# Patient Record
Sex: FEMALE | Race: WHITE | HISPANIC OR LATINO
[De-identification: names, ages, dates, MRNs, and addresses within clinical notes are randomized per-mention and may not be internally consistent; named-entity substitution may affect disease eponyms.]

---

## 2017-01-22 ENCOUNTER — RESULT REVIEW (OUTPATIENT)
Age: 47
End: 2017-01-22

## 2017-01-29 ENCOUNTER — RESULT REVIEW (OUTPATIENT)
Age: 47
End: 2017-01-29

## 2017-03-12 ENCOUNTER — EMERGENCY (EMERGENCY)
Facility: HOSPITAL | Age: 47
LOS: 1 days | Discharge: HOME | End: 2017-03-12
Attending: EMERGENCY MEDICINE | Admitting: EMERGENCY MEDICINE
Payer: COMMERCIAL

## 2017-03-12 VITALS
DIASTOLIC BLOOD PRESSURE: 76 MMHG | RESPIRATION RATE: 16 BRPM | SYSTOLIC BLOOD PRESSURE: 128 MMHG | HEART RATE: 81 BPM | OXYGEN SATURATION: 100 %

## 2017-03-12 VITALS
SYSTOLIC BLOOD PRESSURE: 141 MMHG | OXYGEN SATURATION: 98 % | RESPIRATION RATE: 18 BRPM | TEMPERATURE: 98 F | HEART RATE: 84 BPM | DIASTOLIC BLOOD PRESSURE: 74 MMHG

## 2017-03-12 LAB
ALBUMIN SERPL ELPH-MCNC: 3.9 G/DL — SIGNIFICANT CHANGE UP (ref 3.3–5)
ALP SERPL-CCNC: 84 U/L — SIGNIFICANT CHANGE UP (ref 40–120)
ALT FLD-CCNC: 20 U/L — SIGNIFICANT CHANGE UP (ref 4–33)
APPEARANCE UR: CLEAR — SIGNIFICANT CHANGE UP
AST SERPL-CCNC: 16 U/L — SIGNIFICANT CHANGE UP (ref 4–32)
BASOPHILS # BLD AUTO: 0.01 K/UL — SIGNIFICANT CHANGE UP (ref 0–0.2)
BASOPHILS NFR BLD AUTO: 0.1 % — SIGNIFICANT CHANGE UP (ref 0–2)
BILIRUB SERPL-MCNC: 0.3 MG/DL — SIGNIFICANT CHANGE UP (ref 0.2–1.2)
BILIRUB UR-MCNC: NEGATIVE — SIGNIFICANT CHANGE UP
BLOOD UR QL VISUAL: NEGATIVE — SIGNIFICANT CHANGE UP
BUN SERPL-MCNC: 14 MG/DL — SIGNIFICANT CHANGE UP (ref 7–23)
CALCIUM SERPL-MCNC: 8.9 MG/DL — SIGNIFICANT CHANGE UP (ref 8.4–10.5)
CHLORIDE SERPL-SCNC: 103 MMOL/L — SIGNIFICANT CHANGE UP (ref 98–107)
CK MB BLD-MCNC: 1.24 NG/ML — SIGNIFICANT CHANGE UP (ref 1–4.7)
CK MB BLD-MCNC: SIGNIFICANT CHANGE UP (ref 0–2.5)
CK SERPL-CCNC: 65 U/L — SIGNIFICANT CHANGE UP (ref 25–170)
CO2 SERPL-SCNC: 22 MMOL/L — SIGNIFICANT CHANGE UP (ref 22–31)
COLOR SPEC: SIGNIFICANT CHANGE UP
CREAT SERPL-MCNC: 0.85 MG/DL — SIGNIFICANT CHANGE UP (ref 0.5–1.3)
EOSINOPHIL # BLD AUTO: 0.15 K/UL — SIGNIFICANT CHANGE UP (ref 0–0.5)
EOSINOPHIL NFR BLD AUTO: 1.9 % — SIGNIFICANT CHANGE UP (ref 0–6)
GLUCOSE SERPL-MCNC: 97 MG/DL — SIGNIFICANT CHANGE UP (ref 70–99)
GLUCOSE UR-MCNC: NEGATIVE — SIGNIFICANT CHANGE UP
HCT VFR BLD CALC: 36.2 % — SIGNIFICANT CHANGE UP (ref 34.5–45)
HGB BLD-MCNC: 12.2 G/DL — SIGNIFICANT CHANGE UP (ref 11.5–15.5)
IMM GRANULOCYTES NFR BLD AUTO: 0.1 % — SIGNIFICANT CHANGE UP (ref 0–1.5)
KETONES UR-MCNC: NEGATIVE — SIGNIFICANT CHANGE UP
LEUKOCYTE ESTERASE UR-ACNC: NEGATIVE — SIGNIFICANT CHANGE UP
LIDOCAIN IGE QN: 29.3 U/L — SIGNIFICANT CHANGE UP (ref 7–60)
LYMPHOCYTES # BLD AUTO: 2.17 K/UL — SIGNIFICANT CHANGE UP (ref 1–3.3)
LYMPHOCYTES # BLD AUTO: 28.1 % — SIGNIFICANT CHANGE UP (ref 13–44)
MCHC RBC-ENTMCNC: 31.7 PG — SIGNIFICANT CHANGE UP (ref 27–34)
MCHC RBC-ENTMCNC: 33.7 % — SIGNIFICANT CHANGE UP (ref 32–36)
MCV RBC AUTO: 94 FL — SIGNIFICANT CHANGE UP (ref 80–100)
MONOCYTES # BLD AUTO: 0.41 K/UL — SIGNIFICANT CHANGE UP (ref 0–0.9)
MONOCYTES NFR BLD AUTO: 5.3 % — SIGNIFICANT CHANGE UP (ref 2–14)
MUCOUS THREADS # UR AUTO: SIGNIFICANT CHANGE UP
NEUTROPHILS # BLD AUTO: 4.97 K/UL — SIGNIFICANT CHANGE UP (ref 1.8–7.4)
NEUTROPHILS NFR BLD AUTO: 64.5 % — SIGNIFICANT CHANGE UP (ref 43–77)
NITRITE UR-MCNC: NEGATIVE — SIGNIFICANT CHANGE UP
PH UR: 6 — SIGNIFICANT CHANGE UP (ref 4.6–8)
PLATELET # BLD AUTO: 293 K/UL — SIGNIFICANT CHANGE UP (ref 150–400)
PMV BLD: 9.8 FL — SIGNIFICANT CHANGE UP (ref 7–13)
POTASSIUM SERPL-MCNC: 3.7 MMOL/L — SIGNIFICANT CHANGE UP (ref 3.5–5.3)
POTASSIUM SERPL-SCNC: 3.7 MMOL/L — SIGNIFICANT CHANGE UP (ref 3.5–5.3)
PROT SERPL-MCNC: 7.2 G/DL — SIGNIFICANT CHANGE UP (ref 6–8.3)
PROT UR-MCNC: NEGATIVE — SIGNIFICANT CHANGE UP
RBC # BLD: 3.85 M/UL — SIGNIFICANT CHANGE UP (ref 3.8–5.2)
RBC # FLD: 13.1 % — SIGNIFICANT CHANGE UP (ref 10.3–14.5)
RBC CASTS # UR COMP ASSIST: SIGNIFICANT CHANGE UP (ref 0–?)
SODIUM SERPL-SCNC: 140 MMOL/L — SIGNIFICANT CHANGE UP (ref 135–145)
SP GR SPEC: 1.01 — SIGNIFICANT CHANGE UP (ref 1–1.03)
SQUAMOUS # UR AUTO: SIGNIFICANT CHANGE UP
TROPONIN T SERPL-MCNC: < 0.06 NG/ML — SIGNIFICANT CHANGE UP (ref 0–0.06)
UROBILINOGEN FLD QL: NORMAL E.U. — SIGNIFICANT CHANGE UP (ref 0.1–0.2)
WBC # BLD: 7.72 K/UL — SIGNIFICANT CHANGE UP (ref 3.8–10.5)
WBC # FLD AUTO: 7.72 K/UL — SIGNIFICANT CHANGE UP (ref 3.8–10.5)
WBC UR QL: SIGNIFICANT CHANGE UP (ref 0–?)

## 2017-03-12 PROCEDURE — 93010 ELECTROCARDIOGRAM REPORT: CPT | Mod: 59

## 2017-03-12 PROCEDURE — 71020: CPT | Mod: 26

## 2017-03-12 PROCEDURE — 76705 ECHO EXAM OF ABDOMEN: CPT | Mod: 26

## 2017-03-12 PROCEDURE — 99285 EMERGENCY DEPT VISIT HI MDM: CPT | Mod: 25

## 2017-03-12 RX ORDER — SODIUM CHLORIDE 9 MG/ML
1000 INJECTION INTRAMUSCULAR; INTRAVENOUS; SUBCUTANEOUS ONCE
Qty: 0 | Refills: 0 | Status: COMPLETED | OUTPATIENT
Start: 2017-03-12 | End: 2017-03-12

## 2017-03-12 RX ORDER — FAMOTIDINE 10 MG/ML
20 INJECTION INTRAVENOUS ONCE
Qty: 0 | Refills: 0 | Status: COMPLETED | OUTPATIENT
Start: 2017-03-12 | End: 2017-03-12

## 2017-03-12 RX ORDER — FAMOTIDINE 10 MG/ML
1 INJECTION INTRAVENOUS
Qty: 60 | Refills: 0 | OUTPATIENT
Start: 2017-03-12 | End: 2017-04-11

## 2017-03-12 RX ORDER — ONDANSETRON 8 MG/1
4 TABLET, FILM COATED ORAL ONCE
Qty: 0 | Refills: 0 | Status: COMPLETED | OUTPATIENT
Start: 2017-03-12 | End: 2017-03-12

## 2017-03-12 RX ADMIN — SODIUM CHLORIDE 1000 MILLILITER(S): 9 INJECTION INTRAMUSCULAR; INTRAVENOUS; SUBCUTANEOUS at 20:42

## 2017-03-12 RX ADMIN — FAMOTIDINE 20 MILLIGRAM(S): 10 INJECTION INTRAVENOUS at 20:42

## 2017-03-12 RX ADMIN — Medication 30 MILLILITER(S): at 20:43

## 2017-03-12 NOTE — ED PROVIDER NOTE - ATTENDING CONTRIBUTION TO CARE
48yo F with a hx of panic disorder on lexapro p/w CP x1d. Pt states that she's had epigastric/chest burning in the past after eating heavy foods, but today after eating eggs and spam around 11:30am she had a sudden onset of epigastric/upper abd pain radiating to the back, causing pressure in the chest and a panic attack. She says during the panic attack she became SOB. +nausea, no vomiting, no diaphoresis. Pain is now better but not fully resolved. +recent travel from [facility] 10d ago, no LE edema, no calf pain, no pleurisy, no estrogen use, no recent surgeries, no hx of DVT/PE. On exam VSS lungs, heart, pulses, neuro, extremities, skin, all normal on exam. + mild RUQ tenderness on abdo exam. EKG 81 SR normal ekg. Plan: GI meds  labs CE US RUQ 48yo F with a hx of panic disorder on lexapro p/w CP x1d. Pt states that she's had epigastric/chest burning in the past after eating heavy foods, but today after eating eggs and spam around 11:30am she had a sudden onset of epigastric/upper abd pain radiating to the back, causing pressure in the chest and a panic attack. She says during the panic attack she became SOB. +nausea, no vomiting, no diaphoresis. Pain is now better but not fully resolved. +recent travel from [facility] 10d ago, no LE edema, no calf pain, no pleurisy, no estrogen use, no recent surgeries, no hx of DVT/PE. On exam VSS lungs, heart, pulses, neuro, extremities, skin, all normal on exam. + mild RUQ tenderness on abdo exam. EKG 81 SR normal ekg. Plan: GI meds CXR  labs CE US RUQ. Patient has no risk for PE based on PERC rule

## 2017-03-12 NOTE — ED PROVIDER NOTE - PSH
C Section x2 (11/25/2008, 05/05/2001)    Laparoscopy 1994 - Cervical CA in Situ diagnoses    No significant past surgical history    Tubal Ligation 11/2008

## 2017-03-12 NOTE — ED PROVIDER NOTE - OBJECTIVE STATEMENT
46yo F with a hx of panic disorder on lexapro p/w CP x1d. Pt states that she's had epigastric/chest burning in the past after eating heavy foods, but today after eating eggs and spam around 11:30am she had a sudden onset of epigastric/upper abd pain radiating to the back, causing pressure in the chest and a panic attack. She says during the panic attack she became SOB. +nausea, no vomiting, no diaphoresis. Pain is now better but not fully resolved. +recent travel from [facility] 10d ago, no LE edema, no calf pain, no pleurisy, no estrogen use, no recent surgeries, no hx of DVT/PE.

## 2017-03-12 NOTE — ED ADULT TRIAGE NOTE - CHIEF COMPLAINT QUOTE
p/t c/o of lt sided chest discomfort radiating to back for few days p/t recently traveled to [facility] 10 days ago c/o of mild sob as well

## 2017-03-12 NOTE — ED PROVIDER NOTE - MEDICAL DECISION MAKING DETAILS
Epigastric and CP, likely GI in origin by history. Mild RUQ tenderness. Unremarkable EKG. Plan: labs CXR US trial of GI meds

## 2017-03-12 NOTE — ED ADULT NURSE NOTE - CHIEF COMPLAINT QUOTE
p/t c/o of lt sided chest discomfort radiating to back for few days p/t recently traveled to [facility] 10 days ago c/o of mild sob as well

## 2017-03-12 NOTE — ED ADULT NURSE NOTE - OBJECTIVE STATEMENT
float nurse: pt received a&ox3, c/o epigastric/chest burning and pressure after eating breakfast this morning along with right sided back pain, pt denies SOB/NVD/urinary symptoms, pt denies cardiac hx, pt appears to be comfortable and in NAD, vss as reported, breathing even and unlabored, 20 gauge IV placed in right ac, labs drawn and sent, pt on monitor, report given to RN.

## 2017-03-12 NOTE — ED PROVIDER NOTE - INTERPRETATION
normal sinus rhythm, Normal axis, Normal [place] interval and QRS complex. There are no acute ischemic ST or T-wave changes.

## 2020-05-25 ENCOUNTER — TRANSCRIPTION ENCOUNTER (OUTPATIENT)
Age: 50
End: 2020-05-25

## 2022-09-20 PROBLEM — F41.0 PANIC DISORDER [EPISODIC PAROXYSMAL ANXIETY]: Chronic | Status: ACTIVE | Noted: 2017-03-12

## 2022-09-21 ENCOUNTER — APPOINTMENT (OUTPATIENT)
Dept: ORTHOPEDIC SURGERY | Facility: CLINIC | Age: 52
End: 2022-09-21

## 2022-09-21 VITALS — HEIGHT: 59 IN | BODY MASS INDEX: 32.05 KG/M2 | WEIGHT: 159 LBS

## 2022-09-21 DIAGNOSIS — M25.529 PAIN IN UNSPECIFIED ELBOW: ICD-10-CM

## 2022-09-21 DIAGNOSIS — M77.01 MEDIAL EPICONDYLITIS, RIGHT ELBOW: ICD-10-CM

## 2022-09-21 PROCEDURE — 20551 NJX 1 TENDON ORIGIN/INSJ: CPT

## 2022-09-21 PROCEDURE — 76942 ECHO GUIDE FOR BIOPSY: CPT

## 2022-09-21 PROCEDURE — 73080 X-RAY EXAM OF ELBOW: CPT | Mod: RT

## 2022-09-21 PROCEDURE — 99203 OFFICE O/P NEW LOW 30 MIN: CPT | Mod: 25

## 2022-09-21 NOTE — HISTORY OF PRESENT ILLNESS
[Sudden] : sudden [9] : 9 [5] : 5 [Burning] : burning [Dull/Aching] : dull/aching [Radiating] : radiating [Throbbing] : throbbing [Constant] : constant [] : Post Surgical Visit: no [FreeTextEntry5] : 9/21 No known injury. Patient has been feeling pain in the medial right elbow or the past 2 months and has gradually gotten worse. [FreeTextEntry7] : forearm

## 2022-09-21 NOTE — PHYSICAL EXAM
[] : tenderness over medial epicondyle [Right] : right elbow [No acute displaced fracture or dislocation] : No acute displaced fracture or dislocation

## 2022-10-19 ENCOUNTER — APPOINTMENT (OUTPATIENT)
Dept: ORTHOPEDIC SURGERY | Facility: CLINIC | Age: 52
End: 2022-10-19

## 2023-01-25 ENCOUNTER — APPOINTMENT (OUTPATIENT)
Dept: ORTHOPEDIC SURGERY | Facility: CLINIC | Age: 53
End: 2023-01-25
Payer: COMMERCIAL

## 2023-01-25 VITALS — BODY MASS INDEX: 32.05 KG/M2 | HEIGHT: 59 IN | WEIGHT: 159 LBS

## 2023-01-25 DIAGNOSIS — Z78.9 OTHER SPECIFIED HEALTH STATUS: ICD-10-CM

## 2023-01-25 PROCEDURE — 72100 X-RAY EXAM L-S SPINE 2/3 VWS: CPT

## 2023-01-25 PROCEDURE — 99214 OFFICE O/P EST MOD 30 MIN: CPT

## 2023-01-25 RX ORDER — METHYLPREDNISOLONE 4 MG/1
4 TABLET ORAL
Qty: 1 | Refills: 0 | Status: ACTIVE | COMMUNITY
Start: 2023-01-25 | End: 1900-01-01

## 2023-01-25 NOTE — HISTORY OF PRESENT ILLNESS
[Mid-back] : mid-back [5] : 5 [7] : 7 [Household chores] : household chores [Leisure] : leisure [Work] : work [Sleep] : sleep [Rest] : rest [Physical therapy] : physical therapy [Walking] : walking [Lying in bed] : lying in bed [Full time] : Work status: full time [de-identified] : 52 F with low back pain and pain radiating to right side. Has hx of lBP with MRI showing multilevel degenerative changes with HNP 4 years ago. NO numbness, weakness, tingling in legs.  NO bowel or bladder symptoms. No fevers or chills.  Has been taking NSAIDS with minimal relief.  No recent PT.  NO chiro,acupuncture.   [] : no [FreeTextEntry3] : 1/13/2023 [FreeTextEntry5] : pt states on 1/13/23 she twisted her torso to the right and felt a pop on her spine. She has a MRI from 2/2019. SHe states when leaning forward she feels a pull on her right side. When she drives she has a radiating pain to her right foot. Denies N/T. States she cannot lay flat. 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

## 2023-01-25 NOTE — ASSESSMENT
[FreeTextEntry1] : 52 F with RLE radic , weakness, failign OTC NSAIDS\par MDP\par MRI L spine\par FU after MRI\par

## 2023-01-25 NOTE — IMAGING
[de-identified] : Spine:\par Inspection/Palpation:\par No tenderness to palpation throughout Cervical/thoracic/lumbar spine.\par No bony stepoffs, No lesions.\par \par Gait:\par Non-antalgic, able to perform bilateral toe and heel rise.  Able to perform tandem gait.  \par \par \par Neurologic:\par \par Bilateral Lower Extremities 5/5 Iliopsoas/Quadriceps/Hamstrings/ Tibialis Anterior/ Gastrocnemius. Extensor Hallucis Longus/ Flexor Hallucis Longus except R quad 4+/5\par \par \par Sensation intact to light touch L2-S1\par \par Patellar/ Achilles reflex within normal limits.\par \par \par Negative straight leg raise\par \par No pain with IR/ER/Flexion of HIps bilaterally \par \par MRI L spine from  showing multielvel degen changes.  L4-5 and L5-S1 with HNP. \par \par

## 2023-01-27 ENCOUNTER — FORM ENCOUNTER (OUTPATIENT)
Age: 53
End: 2023-01-27

## 2023-01-28 ENCOUNTER — APPOINTMENT (OUTPATIENT)
Dept: MRI IMAGING | Facility: CLINIC | Age: 53
End: 2023-01-28
Payer: COMMERCIAL

## 2023-01-28 PROCEDURE — 72148 MRI LUMBAR SPINE W/O DYE: CPT

## 2023-01-31 ENCOUNTER — TRANSCRIPTION ENCOUNTER (OUTPATIENT)
Age: 53
End: 2023-01-31

## 2023-02-01 ENCOUNTER — APPOINTMENT (OUTPATIENT)
Dept: ORTHOPEDIC SURGERY | Facility: CLINIC | Age: 53
End: 2023-02-01
Payer: COMMERCIAL

## 2023-02-01 VITALS — HEIGHT: 59 IN | BODY MASS INDEX: 32.05 KG/M2 | WEIGHT: 159 LBS

## 2023-02-01 DIAGNOSIS — M54.41 LUMBAGO WITH SCIATICA, RIGHT SIDE: ICD-10-CM

## 2023-02-01 PROCEDURE — 99213 OFFICE O/P EST LOW 20 MIN: CPT

## 2023-02-01 NOTE — ASSESSMENT
[FreeTextEntry1] : 52 F with RLE radic  50% improved. \par PT\par FU 6 weeks\par if pain persist SILVIANO vs trial of oral medications. \par

## 2023-02-01 NOTE — HISTORY OF PRESENT ILLNESS
[Mid-back] : mid-back [5] : 5 [7] : 7 [Household chores] : household chores [Leisure] : leisure [Work] : work [Sleep] : sleep [Rest] : rest [Physical therapy] : physical therapy [Walking] : walking [Lying in bed] : lying in bed [Full time] : Work status: full time [Lower back] : lower back [de-identified] : 02/01/2023: *MRI results* Pt reports that pain has partially improved.  Pt can lay down straight in bed, but still has intense pain on her side.  Pt did not take MDP, but is currently using OTC Advil once or BID which provides good temporary relief.\par \par 1/25/23 52 F with low back pain and pain radiating to right side. Has hx of lBP with MRI showing multilevel degenerative changes with HNP 4 years ago. NO numbness, weakness, tingling in legs.  NO bowel or bladder symptoms. No fevers or chills.  Has been taking NSAIDS with minimal relief.  No recent PT.  NO chiro,acupuncture.   [] : no [FreeTextEntry1] : Mid and Lower Back F/U *MRI [FreeTextEntry3] : 1/13/2023 [FreeTextEntry5] : pt states on 1/13/23 she twisted her torso to the right and felt a pop on her spine. She has a MRI from 2/2019. SHe states when leaning forward she feels a pull on her right side. When she drives she has a radiating pain to her right foot. Denies N/T. States she cannot lay flat. 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

## 2023-02-01 NOTE — IMAGING
[de-identified] : Spine:\par Inspection/Palpation:\par No tenderness to palpation throughout Cervical/thoracic/lumbar spine.\par No bony stepoffs, No lesions.\par \par Gait:\par Non-antalgic, able to perform bilateral toe and heel rise.  Able to perform tandem gait.  \par \par \par Neurologic:\par \par Bilateral Lower Extremities 5/5 Iliopsoas/Quadriceps/Hamstrings/ Tibialis Anterior/ Gastrocnemius. Extensor Hallucis Longus/ Flexor Hallucis Longus except R quad 4+/5\par \par \par Sensation intact to light touch L2-S1\par \par Patellar/ Achilles reflex within normal limits.\par \par \par Negative straight leg raise\par \par No pain with IR/ER/Flexion of HIps bilaterally \par \par MRI L spine from  showing multielvel degen changes.  L4-5 and L5-S1 with HNP. \par \par

## 2023-09-19 ENCOUNTER — APPOINTMENT (OUTPATIENT)
Dept: ORTHOPEDIC SURGERY | Facility: CLINIC | Age: 53
End: 2023-09-19
Payer: COMMERCIAL

## 2023-09-19 VITALS — BODY MASS INDEX: 32.05 KG/M2 | WEIGHT: 159 LBS | HEIGHT: 59 IN

## 2023-09-19 DIAGNOSIS — M75.81 OTHER SHOULDER LESIONS, RIGHT SHOULDER: ICD-10-CM

## 2023-09-19 PROCEDURE — 73030 X-RAY EXAM OF SHOULDER: CPT | Mod: RT

## 2023-09-19 PROCEDURE — 73010 X-RAY EXAM OF SHOULDER BLADE: CPT | Mod: RT

## 2023-09-19 PROCEDURE — 99214 OFFICE O/P EST MOD 30 MIN: CPT

## 2023-09-19 RX ORDER — DICLOFENAC SODIUM 75 MG/1
75 TABLET, DELAYED RELEASE ORAL TWICE DAILY
Qty: 60 | Refills: 1 | Status: COMPLETED | COMMUNITY
Start: 2023-09-19 | End: 2023-11-18

## 2023-10-24 ENCOUNTER — APPOINTMENT (OUTPATIENT)
Dept: ORTHOPEDIC SURGERY | Facility: CLINIC | Age: 53
End: 2023-10-24

## 2025-05-08 NOTE — ED PROVIDER NOTE - CARE PLAN
Refill request received for levothyroxine 125 mcg    According to Augustus's note on 5/229/24:    Continue with current dose of levothyroxine.   Pt will need yearly refills for levothyroxine.   Recheck TSH in 1 yr or earlier if sx.     And Augustus' note on 5/28/24:    On levothyroxine 125 mcg x 6 days a week and one day at 1/2 pill (62.5mcq)     Labs  Component      Latest Ref Rng 5/28/2024  1:11 PM   TSH      0.350 - 5.000 mcUnits/mL 2.102      Due for labs this month. Sent for 30 day courtesy supply    Follow up appointment 8/1/25  Prescription sent    
Principal Discharge DX:	Abdominal pain